# Patient Record
Sex: FEMALE
[De-identification: names, ages, dates, MRNs, and addresses within clinical notes are randomized per-mention and may not be internally consistent; named-entity substitution may affect disease eponyms.]

---

## 2021-01-27 ENCOUNTER — RESULT REVIEW (OUTPATIENT)
Age: 21
End: 2021-01-27

## 2021-05-11 ENCOUNTER — TRANSCRIPTION ENCOUNTER (OUTPATIENT)
Age: 21
End: 2021-05-11

## 2021-11-17 ENCOUNTER — TRANSCRIPTION ENCOUNTER (OUTPATIENT)
Age: 21
End: 2021-11-17

## 2022-05-09 ENCOUNTER — NON-APPOINTMENT (OUTPATIENT)
Age: 22
End: 2022-05-09

## 2023-09-05 ENCOUNTER — APPOINTMENT (OUTPATIENT)
Dept: OBGYN | Facility: CLINIC | Age: 23
End: 2023-09-05
Payer: COMMERCIAL

## 2023-09-05 VITALS
HEIGHT: 63 IN | SYSTOLIC BLOOD PRESSURE: 141 MMHG | WEIGHT: 150 LBS | BODY MASS INDEX: 26.58 KG/M2 | DIASTOLIC BLOOD PRESSURE: 100 MMHG

## 2023-09-05 DIAGNOSIS — Z01.419 ENCOUNTER FOR GYNECOLOGICAL EXAMINATION (GENERAL) (ROUTINE) W/OUT ABNORMAL FINDINGS: ICD-10-CM

## 2023-09-05 PROCEDURE — 99385 PREV VISIT NEW AGE 18-39: CPT

## 2023-09-05 RX ORDER — NORETHINDRONE ACETATE AND ETHINYL ESTRADIOL AND FERROUS FUMARATE 1MG-20(21)
1-20 KIT ORAL DAILY
Qty: 3 | Refills: 3 | Status: ACTIVE | COMMUNITY
Start: 2023-09-05 | End: 1900-01-01

## 2023-09-05 NOTE — END OF VISIT
[FreeTextEntry2] : I, Fatou John, acted as a scribe on behalf of Dr. Apoorva Ron on 09/05/2023 .  All medical entries made by the scribe were at my, Dr. Apoorva Ron, direction and personally dictated by me on 09/05/2023. I have reviewed the chart and agree that the record accurately reflects my personal performance of the history, physical exam, assessment and plan. I have also personally directed, reviewed, and agreed with the chart.

## 2023-09-05 NOTE — PLAN
[FreeTextEntry1] : 22 y/o new pt with h/o PCOS presents for establishment of GYN care.  - PAP done today - refill Junel - RTO in 1 year

## 2023-09-05 NOTE — HISTORY OF PRESENT ILLNESS
[FreeTextEntry1] : 24 y/o G0 new pt with h/o PCOS presents for establishment of GYN care. Last seen by a GYN in 2021, negative PAP smear. pt is unsure of family history of cancers. She is on OCP Junel for acne and mood symptoms, happy on it. She also takes Spironolactone. Unsure if she had the HPV vaccine. Denies h/o sexual activity, identifies as straight. Pt works for [employer]. She recently moved out of her parents' home and lives alone in [facility]..  ObHx: none GYNHx: PCOS, no h/o abnormal PAP SocHx: works with [employer] Allergies: NKDA Meds: Junel, Spironolactone [PapSmeardate] : 01/2021 [TextBox_31] : wnl [LMPDate] : 08/28/23

## 2023-09-08 LAB — CYTOLOGY CVX/VAG DOC THIN PREP: NORMAL
